# Patient Record
Sex: MALE
[De-identification: names, ages, dates, MRNs, and addresses within clinical notes are randomized per-mention and may not be internally consistent; named-entity substitution may affect disease eponyms.]

---

## 2017-03-30 ENCOUNTER — APPOINTMENT (OUTPATIENT)
Dept: PULMONOLOGY | Facility: CLINIC | Age: 62
End: 2017-03-30

## 2017-03-31 PROBLEM — Z00.00 ENCOUNTER FOR PREVENTIVE HEALTH EXAMINATION: Status: ACTIVE | Noted: 2017-03-31

## 2018-07-02 ENCOUNTER — HOSPITAL ENCOUNTER (OUTPATIENT)
Dept: HOSPITAL 74 - JASU-SURG | Age: 63
Discharge: HOME | End: 2018-07-02
Attending: UROLOGY
Payer: COMMERCIAL

## 2018-07-02 VITALS — BODY MASS INDEX: 31.2 KG/M2

## 2018-07-02 VITALS — HEART RATE: 70 BPM | DIASTOLIC BLOOD PRESSURE: 85 MMHG | SYSTOLIC BLOOD PRESSURE: 135 MMHG

## 2018-07-02 VITALS — TEMPERATURE: 97.8 F

## 2018-07-02 DIAGNOSIS — N20.0: Primary | ICD-10-CM

## 2018-07-02 PROCEDURE — 0TF3XZZ FRAGMENTATION IN RIGHT KIDNEY PELVIS, EXTERNAL APPROACH: ICD-10-PCS | Performed by: UROLOGY

## 2018-07-02 NOTE — OP
DATE OF OPERATION:  07/02/2018

 

PREOPERATIVE DIAGNOSIS:  Right renal stone.

 

POSTOPERATIVE DIAGNOSIS:  Right renal stone.

 

PROCEDURE:  Right extracorporeal shock wave lithotripsy.

 

ATTENDING:  Adarsh Rivera MD

 

ANESTHESIA:  Fractional.

 

DESCRIPTION OF OPERATION:  The patient was brought in the operating room, placed in

supine position on the operating room table.  Ultrasonography and fluoroscopy were

performed.  A 5-mm right mid-pole stone was identified.  At this point, anesthesia

and preoperative antibiotics were administered.  Shock wave lithotripsy was then

performed; 2500 impulses at 17 joules of power were administered to the stone with

excellent fragmentation of the stone noted.  There were no complications noted.  The

disposition of the patient was to the recovery room.

 

 

ADARSH RIVERA M.D. [id number]

DD: 07/02/2018 16:57

DT: 07/02/2018 22:41

Job #:  [id number]

## 2018-07-02 NOTE — OP
Operative Note





- Note:


Operative Date: 07/02/18


Pre-Operative Diagnosis: Right kidney stone


Operation: Right ESWL


Findings: 





5 mm mid pole Right kidney stone


Post-Operative Diagnosis: Same as Pre-op


Surgeon: Duglas Bryan (not complicated)


Anesthesia: Fractional

## 2025-07-07 ENCOUNTER — HOSPITAL ENCOUNTER (OUTPATIENT)
Dept: HOSPITAL 74 - JASU-SURG | Age: 70
Discharge: HOME | End: 2025-07-07
Attending: UROLOGY
Payer: COMMERCIAL

## 2025-07-07 VITALS — BODY MASS INDEX: 30.7 KG/M2

## 2025-07-07 VITALS — HEART RATE: 68 BPM | TEMPERATURE: 97.6 F | SYSTOLIC BLOOD PRESSURE: 141 MMHG | DIASTOLIC BLOOD PRESSURE: 72 MMHG

## 2025-07-07 VITALS — RESPIRATION RATE: 18 BRPM

## 2025-07-07 DIAGNOSIS — N20.0: Primary | ICD-10-CM

## 2025-07-07 PROCEDURE — 0TF4XZZ FRAGMENTATION IN LEFT KIDNEY PELVIS, EXTERNAL APPROACH: ICD-10-PCS | Performed by: UROLOGY

## 2025-07-07 RX ADMIN — LEVOFLOXACIN ONE MG: 5 INJECTION, SOLUTION INTRAVENOUS at 10:25
